# Patient Record
Sex: MALE | Race: WHITE | NOT HISPANIC OR LATINO | Employment: UNEMPLOYED | URBAN - METROPOLITAN AREA
[De-identification: names, ages, dates, MRNs, and addresses within clinical notes are randomized per-mention and may not be internally consistent; named-entity substitution may affect disease eponyms.]

---

## 2020-02-07 ENCOUNTER — OFFICE VISIT (OUTPATIENT)
Dept: URGENT CARE | Facility: MEDICAL CENTER | Age: 25
End: 2020-02-07
Payer: COMMERCIAL

## 2020-02-07 VITALS
BODY MASS INDEX: 25.9 KG/M2 | TEMPERATURE: 98.6 F | SYSTOLIC BLOOD PRESSURE: 124 MMHG | DIASTOLIC BLOOD PRESSURE: 76 MMHG | OXYGEN SATURATION: 98 % | HEART RATE: 71 BPM | RESPIRATION RATE: 16 BRPM | WEIGHT: 191 LBS

## 2020-02-07 DIAGNOSIS — S61.451A INFECTED DOG BITE OF HAND INCLUDING FINGERS, RIGHT, INITIAL ENCOUNTER: ICD-10-CM
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
## 2020-02-07 DIAGNOSIS — S61.259A INFECTED DOG BITE OF HAND INCLUDING FINGERS, RIGHT, INITIAL ENCOUNTER: ICD-10-CM

## 2020-02-07 DIAGNOSIS — L08.9 INFECTED DOG BITE OF HAND INCLUDING FINGERS, RIGHT, INITIAL ENCOUNTER: ICD-10-CM

## 2020-02-07 DIAGNOSIS — L03.011 CELLULITIS OF FINGER OF RIGHT HAND: ICD-10-CM

## 2020-02-07 PROCEDURE — 99204 OFFICE O/P NEW MOD 45 MIN: CPT | Performed by: NURSE PRACTITIONER

## 2020-02-07 RX ORDER — CEPHALEXIN 500 MG/1
500 CAPSULE ORAL 4 TIMES DAILY
Qty: 28 CAP | Refills: 0 | Status: SHIPPED | OUTPATIENT
Start: 2020-02-07 | End: 2020-02-14

## 2020-02-07 ASSESSMENT — ENCOUNTER SYMPTOMS
FEVER: 0
NAUSEA: 0
SHORTNESS OF BREATH: 0
WEAKNESS: 0
MYALGIAS: 0
SENSORY CHANGE: 0
BRUISES/BLEEDS EASILY: 0
TINGLING: 0
CHILLS: 0
VOMITING: 0

## 2020-02-07 NOTE — PROGRESS NOTES
"Subjective:      Lobito Iverson is a 24 y.o. male who presents with Laceration (infected cut on R thumb )            HPI  States his dog bit him in the right thumb a week ago. Cleaned area with hydrogen peroxide and alcohol, Neosporin. States had gotten purulent d/c from site with the skin \"broke open\". Denies fever or malaise. Dog UTD with vaccines. States never received Tdap as his mother did not vaccinate as child.     PMH:  has no past medical history on file.  MEDS:   Current Outpatient Medications:   •  cephALEXin (KEFLEX) 500 MG Cap, Take 1 Cap by mouth 4 times a day for 7 days., Disp: 28 Cap, Rfl: 0  •  hydrocodone-acetaminophen (NORCO) 5-325 MG Tab per tablet, Take 1-2 Tabs by mouth every four hours as needed. (Patient not taking: Reported on 2/7/2020), Disp: 20 Tab, Rfl: 0  ALLERGIES: No Known Allergies  SURGHX: History reviewed. No pertinent surgical history.  SOCHX:  reports that he has never smoked. He has never used smokeless tobacco. He reports current alcohol use. He reports that he does not use drugs.  FH: Family history was reviewed, no pertinent findings to report    Review of Systems   Constitutional: Negative for chills, fever and malaise/fatigue.   Respiratory: Negative for shortness of breath.    Gastrointestinal: Negative for nausea and vomiting.   Musculoskeletal: Negative for joint pain and myalgias.   Skin: Negative for itching and rash.   Neurological: Negative for tingling, sensory change and weakness.   Endo/Heme/Allergies: Does not bruise/bleed easily.   All other systems reviewed and are negative.         Objective:     /76   Pulse 71   Temp 37 °C (98.6 °F) (Temporal)   Resp 16   Wt 86.6 kg (191 lb)   SpO2 98%   BMI 25.90 kg/m²      Physical Exam  Vitals signs reviewed.   Constitutional:       General: He is awake. He is not in acute distress.     Appearance: Normal appearance. He is well-developed. He is not ill-appearing, toxic-appearing or diaphoretic.   HENT:      " Head: Normocephalic.   Cardiovascular:      Rate and Rhythm: Normal rate.   Pulmonary:      Effort: Pulmonary effort is normal. No accessory muscle usage or respiratory distress.   Musculoskeletal:         General: Tenderness present. No deformity.      Right hand: He exhibits laceration. He exhibits normal range of motion, no tenderness, no bony tenderness, normal two-point discrimination, normal capillary refill, no deformity and no swelling. Normal sensation noted. Normal strength noted.   Skin:     General: Skin is warm and dry.      Findings: Erythema, laceration and wound present. No abrasion, abscess, bruising, ecchymosis, lesion or rash.      Comments: Right thumb has two superficial lacerations to dorsal aspect. No swelling or d/c from sites. Mild redness around sites. Healing with mild dehiscence with yellow tissue/scab forming. FROM of all fingers.   Neurological:      Mental Status: He is alert and oriented to person, place, and time.   Psychiatric:         Behavior: Behavior is cooperative.                 Assessment/Plan:       1. Cellulitis of finger of right hand    - cephALEXin (KEFLEX) 500 MG Cap; Take 1 Cap by mouth 4 times a day for 7 days.  Dispense: 28 Cap; Refill: 0    2. Infected dog bite of hand including fingers, right, initial encounter    - cephALEXin (KEFLEX) 500 MG Cap; Take 1 Cap by mouth 4 times a day for 7 days.  Dispense: 28 Cap; Refill: 0  May apply cool compress to area for any swelling   Keep area clean with mild soap and tepid water, pat dry  May apply TAB ointment with no weeping, drainage from site  May cover loosely with bandage to prevent dirt or debris into wounds, as discussed  Monitor for skin infection with increased swelling, redness, pain, d/c, fever, malaise, red streaking-recheck  Return prn

## 2021-02-15 ENCOUNTER — OFFICE VISIT (OUTPATIENT)
Dept: URGENT CARE | Facility: CLINIC | Age: 26
End: 2021-02-15

## 2021-02-15 VITALS
SYSTOLIC BLOOD PRESSURE: 122 MMHG | HEART RATE: 64 BPM | HEIGHT: 72 IN | OXYGEN SATURATION: 98 % | RESPIRATION RATE: 14 BRPM | WEIGHT: 196 LBS | TEMPERATURE: 98 F | BODY MASS INDEX: 26.55 KG/M2 | DIASTOLIC BLOOD PRESSURE: 76 MMHG

## 2021-02-15 DIAGNOSIS — S61.219A LACERATION OF FINGER OF RIGHT HAND WITHOUT FOREIGN BODY WITHOUT DAMAGE TO NAIL, UNSPECIFIED FINGER, INITIAL ENCOUNTER: ICD-10-CM

## 2021-02-15 PROCEDURE — 12001 RPR S/N/AX/GEN/TRNK 2.5CM/<: CPT | Performed by: NURSE PRACTITIONER

## 2021-02-15 ASSESSMENT — ENCOUNTER SYMPTOMS
FOCAL WEAKNESS: 0
TINGLING: 0
WEAKNESS: 0
ROS SKIN COMMENTS: LACERATION

## 2021-02-16 NOTE — PROGRESS NOTES
Subjective:   Lobito Iverson is a 25 y.o. male who presents for Laceration (right thumb laceration x today , pinched between a door hinge )      HPI  25-year-old male patient in urgent care for new laceration to the right hand in the webbing between the index finger and thumb from a spring-loaded oven door that snapped shut on the area.  Patient states he is up-to-date with his tetanus shot having received one last year.  Denies any numbness or tingling.  Has not needed to take anything over-the-counter for pain    Review of Systems   Skin:        laceration   Neurological: Negative for tingling, focal weakness and weakness.       There is no problem list on file for this patient.    History reviewed. No pertinent surgical history.  Social History     Tobacco Use   • Smoking status: Never Smoker   • Smokeless tobacco: Never Used   Substance Use Topics   • Alcohol use: Yes     Comment: socially   • Drug use: No      History reviewed. No pertinent family history.   (Allergies, Medications, & Tobacco/Substance Use were reconciled by the Medical Assistant and reviewed by myself. The family history is prepopulated)     Objective:     /76 (BP Location: Left arm, Patient Position: Sitting, BP Cuff Size: Adult)   Pulse 64   Temp 36.7 °C (98 °F) (Temporal)   Resp 14   Ht 1.829 m (6')   Wt 88.9 kg (196 lb)   SpO2 98%   BMI 26.58 kg/m²     Physical Exam  Vitals reviewed.   Constitutional:       Appearance: Normal appearance.   Cardiovascular:      Rate and Rhythm: Normal rate and regular rhythm.      Heart sounds: Normal heart sounds.   Pulmonary:      Effort: Pulmonary effort is normal.      Breath sounds: Normal breath sounds.   Musculoskeletal:        Hands:    Skin:     General: Skin is warm.      Capillary Refill: Capillary refill takes less than 2 seconds.   Neurological:      Mental Status: He is alert and oriented to person, place, and time.   Psychiatric:         Mood and Affect: Mood normal.          Behavior: Behavior normal.         Thought Content: Thought content normal.         Judgment: Judgment normal.         Assessment/Plan:     1. Laceration of finger of right hand without foreign body without damage to nail, unspecified finger, initial encounter       Procedure: Laceration Repair  -Risks including bleeding, nerve damage, infection, and poor cosmetic outcome discussed. Benefits and alternatives discussed.   -Clean technique with sterile instruments and suture used  -Local anesthesia with 2% lidocaine  -Closed with #6  4-0 Nylon interrupted sutures with good wound approximation  -Polysporin and dressing placed  -Patient tolerated well    Differential diagnosis, natural history, supportive care, and indications for immediate follow-up discussed.    Advised the patient to follow-up with the primary care physician for recheck, reevaluation, and consideration of further management.    Please note that this dictation was created using voice recognition software. I have made a reasonable attempt to correct obvious errors, but I expect that there are errors of grammar and possibly content that I did not discover before finalizing the note.    This note was electronically signed JANET Mcgowan